# Patient Record
Sex: FEMALE | Race: WHITE | Employment: OTHER | URBAN - METROPOLITAN AREA
[De-identification: names, ages, dates, MRNs, and addresses within clinical notes are randomized per-mention and may not be internally consistent; named-entity substitution may affect disease eponyms.]

---

## 2018-03-14 ENCOUNTER — HOSPITAL ENCOUNTER (OUTPATIENT)
Dept: CT IMAGING | Age: 68
Discharge: HOME | End: 2018-03-14
Attending: SPECIALIST
Payer: MEDICARE

## 2018-03-14 DIAGNOSIS — H93.13 TINNITUS, BILATERAL: ICD-10-CM

## 2018-03-14 PROCEDURE — 70480 CT ORBIT/EAR/FOSSA W/O DYE: CPT

## 2018-08-14 ENCOUNTER — TELEPHONE (OUTPATIENT)
Dept: OBGYN CLINIC | Age: 68
End: 2018-08-14

## 2018-08-14 ENCOUNTER — OFFICE VISIT (OUTPATIENT)
Dept: OBGYN CLINIC | Age: 68
End: 2018-08-14

## 2018-08-14 VITALS
WEIGHT: 151 LBS | BODY MASS INDEX: 25.78 KG/M2 | DIASTOLIC BLOOD PRESSURE: 80 MMHG | SYSTOLIC BLOOD PRESSURE: 120 MMHG | HEIGHT: 64 IN

## 2018-08-14 DIAGNOSIS — N94.89 VULVAR BURNING: ICD-10-CM

## 2018-08-14 DIAGNOSIS — Z12.4 ROUTINE CERVICAL SMEAR: ICD-10-CM

## 2018-08-14 DIAGNOSIS — Z01.419 ENCOUNTER FOR GYNECOLOGICAL EXAMINATION (GENERAL) (ROUTINE) WITHOUT ABNORMAL FINDINGS: Primary | ICD-10-CM

## 2018-08-14 LAB
BILIRUB UR QL STRIP: NEGATIVE
GLUCOSE UR-MCNC: NEGATIVE MG/DL
KETONES P FAST UR STRIP-MCNC: NEGATIVE MG/DL
PH UR STRIP: NORMAL [PH] (ref 4.6–8)
PROT UR QL STRIP: NEGATIVE
SP GR UR STRIP: NORMAL (ref 1–1.03)
UA UROBILINOGEN AMB POC: NORMAL (ref 0.2–1)
URINALYSIS CLARITY POC: NORMAL
URINALYSIS COLOR POC: NORMAL
URINE BLOOD POC: NEGATIVE
URINE LEUKOCYTES POC: NEGATIVE
URINE NITRITES POC: NEGATIVE

## 2018-08-14 NOTE — TELEPHONE ENCOUNTER
Patient calling stating that she was seen today and given premarin and when was reading the side effects and warnings with the medication, the patient is concerned because she had severe headaches with estrace cream and now the premarin cream has there same side effects and warnings and she does not think she wants this cream.  Patient explained that in the note, Intrarosa would be the next alternative, but would be expensive and patient does not want that either. Patient became very upset over the phone and asked me to send the message to Dr. Hannah Gaming. Please advise.

## 2018-08-14 NOTE — TELEPHONE ENCOUNTER
Patient calling back and aware of MD recommendations. Patient was not happy with Md recommendations and states \" thank you\" and the call was disconnected.

## 2018-08-14 NOTE — PATIENT INSTRUCTIONS
Breast Self-Exam: Care Instructions  Your Care Instructions    A breast self-exam is when you check your breasts for lumps or changes. This regular exam helps you learn how your breasts normally look and feel. Most breast problems or changes are not because of cancer. Breast self-exam is not a substitute for a mammogram. Having regular breast exams by your doctor and regular mammograms improve your chances of finding any problems with your breasts. Some women set a time each month to do a step-by-step breast self-exam. Other women like a less formal system. They might look at their breasts as they brush their teeth, or feel their breasts once in a while in the shower. If you notice a change in your breast, tell your doctor. Follow-up care is a key part of your treatment and safety. Be sure to make and go to all appointments, and call your doctor if you are having problems. It's also a good idea to know your test results and keep a list of the medicines you take. How do you do a breast self-exam?  · The best time to examine your breasts is usually one week after your menstrual period begins. Your breasts should not be tender then. If you do not have periods, you might do your exam on a day of the month that is easy to remember. · To examine your breasts:  ¨ Remove all your clothes above the waist and lie down. When you are lying down, your breast tissue spreads evenly over your chest wall, which makes it easier to feel all your breast tissue. ¨ Use the pads-not the fingertips-of the 3 middle fingers of your left hand to check your right breast. Move your fingers slowly in small coin-sized circles that overlap. ¨ Use three levels of pressure to feel of all your breast tissue. Use light pressure to feel the tissue close to the skin surface. Use medium pressure to feel a little deeper. Use firm pressure to feel your tissue close to your breastbone and ribs.  Use each pressure level to feel your breast tissue before moving on to the next spot. ¨ Check your entire breast, moving up and down as if following a strip from the collarbone to the bra line, and from the armpit to the ribs. Repeat until you have covered the entire breast.  ¨ Repeat this procedure for your left breast, using the pads of the 3 middle fingers of your right hand. · To examine your breasts while in the shower:  ¨ Place one arm over your head and lightly soap your breast on that side. ¨ Using the pads of your fingers, gently move your hand over your breast (in the strip pattern described above), feeling carefully for any lumps or changes. ¨ Repeat for the other breast.  · Have your doctor inspect anything you notice to see if you need further testing. Where can you learn more? Go to http://elizabeth-wendy.info/. Enter P148 in the search box to learn more about \"Breast Self-Exam: Care Instructions. \"  Current as of: May 12, 2017  Content Version: 11.7  © 7687-3728 [employer]. Care instructions adapted under license by [employer] (which disclaims liability or warranty for this information). If you have questions about a medical condition or this instruction, always ask your healthcare professional. [place] [address] any warranty or liability for your use of this information.

## 2018-08-14 NOTE — TELEPHONE ENCOUNTER
We discussed at her visit that estrogen is really the best choice. She refused the estring and the vagifem. If she wants to try the intrarosa I am happy to send that for her. Premarin IS estrogen and they all have the same class labeling. I only changed it do insurance would pay. I'm sorry I don't have any other good options for her.  I would encourage to try the premarin

## 2018-08-14 NOTE — PROGRESS NOTES
Mandeep Diaz is a No obstetric history on file. ,  77 y.o. female [facility] whose LMP was on  who presents for her annual checkup. She is menopausal and amenorrheic. Patient c/o pelvic pressure and vaginal itching. She states it has been going on for about 2 years. Patient saw Dr. Danielle Segovia 9/19/2016 for vaginal itching. She is having no significant problems. Hormone Status:    She is not having vasomotor symptoms. The patient is not using HRT. Hx of using estrace cream and states it gave her a severe headache. She has not had any vaginal bleeding. She reports no gynecologic symptoms. Sexual history:    She  reports that she does not currently engage in sexual activity. Medical conditions:    Since her last annual GYN exam about two years ago, she has had the following changes in her health history: none. Surgical history confirmed with patient. has a past surgical history that includes hx orthopaedic; hx hip replacement (Bilateral); and hx acl reconstruction (Right). Pap and Mammogram History:    Her most recent Pap smear was normal, per patient obtained 2 year(s) ago. No record in [facility]. The patient had a recent mammogram 4/2018 which was negative for malignancy    Breast Cancer History/Substance Abuse:     She does not have a family history of breast cancer. Osteoporosis History:    Family history does not include a first or second degree relative with osteopenia or osteoporosis. A bone density scan was obtained 6/2017 and revealed osteopenia. She is currently taking calcium and vit D.     Past Medical History:   Diagnosis Date    Chest pain, unspecified     Endocrine disease     Essential hypertension, benign 3/28/2011    Family history of ischemic heart disease     GERD (gastroesophageal reflux disease)     Hypertension     Other and unspecified hyperlipidemia     Other ill-defined conditions(799.89)      Past Surgical History:   Procedure Laterality Date  HX ACL RECONSTRUCTION Right     HX HIP REPLACEMENT Bilateral     HX ORTHOPAEDIC       Current Outpatient Prescriptions   Medication Sig Dispense Refill    acyclovir (ZOVIRAX) 200 mg capsule Take 400 mg by mouth daily.  esomeprazole (NEXIUM) 40 mg capsule Take 40 mg by mouth daily.  BABY ASPIRIN PO Take 81 mg by mouth daily.  cholecalciferol, vitamin D3, 2,000 unit tab Take 2,000 Units by mouth daily.  atenolol (TENORMIN) 50 mg tablet Take 50 mg by mouth daily.  hydrochlorothiazide (MICROZIDE) 12.5 mg capsule Take 12.5 mg by mouth daily.  levothyroxine (SYNTHROID) 150 mcg tablet Take 137 mcg by mouth Daily (before breakfast).  clobetasol (TEMOVATE) 0.05 % ointment Apply  to affected area two (2) times a day. 15 g 0    HYDROcodone-acetaminophen (NORCO) 7.5-325 mg per tablet Take 1 Tab by mouth every six (6) hours as needed for Pain. Max Daily Amount: 4 Tabs. Do not drive within 6 hours of taking 20 Tab 0     Allergies: Erythromycin and Naproxen   Social History     Social History    Marital status:      Spouse name: N/A    Number of children: N/A    Years of education: N/A     Occupational History    Not on file. Social History Main Topics    Smoking status: Former Smoker    Smokeless tobacco: Never Used    Alcohol use No    Drug use: No    Sexual activity: Not Currently     Other Topics Concern    Not on file     Social History Narrative     Tobacco History:  reports that she has quit smoking. She has never used smokeless tobacco.  Alcohol Abuse:  reports that she does not drink alcohol. Drug Abuse:  reports that she does not use illicit drugs.   Patient Active Problem List   Diagnosis Code    Chest pain, unspecified R07.9    GERD (gastroesophageal reflux disease) K21.9    Other and unspecified hyperlipidemia E78.5    Family history of ischemic heart disease Z82.49    Essential hypertension, benign I10       Review of Systems - History obtained from the patient  Constitutional: negative for weight loss, fever, night sweats  HEENT: negative for hearing loss, earache, congestion, snoring, sorethroat  CV: negative for chest pain, palpitations, edema  Resp: negative for cough, shortness of breath, wheezing  GI: negative for change in bowel habits, abdominal pain, black or bloody stools  : negative for frequency, dysuria, hematuria, vaginal discharge  MSK: negative for back pain, joint pain, muscle pain  Breast: negative for breast lumps, nipple discharge, galactorrhea  Skin :negative for itching, rash, hives  Neuro: negative for dizziness, headache, confusion, weakness  Psych: negative for anxiety, depression, change in mood  Heme/lymph: negative for bleeding, bruising, pallor    Physical Exam    Visit Vitals    /80    Ht 5' 4\" (1.626 m)    Wt 151 lb (68.5 kg)    BMI 25.92 kg/m2     Constitutional  · Appearance: well-nourished, well developed, alert, in no acute distress    HENT  · Head and Face: appears normal    Neck  · Inspection/Palpation: normal appearance, no masses or tenderness  · Lymph Nodes: no lymphadenopathy present  · Thyroid: gland size normal, nontender, no nodules or masses present on palpation    Chest  · Respiratory Effort: breathing normal  · Auscultation: normal breath sounds    Cardiovascular  · Heart:  · Auscultation: regular rate and rhythm without murmur    Breasts  · Inspection of Breasts: breasts symmetrical, no skin changes, no discharge present, nipple appearance normal, no skin retraction present  · Palpation of Breasts and Axillae: no masses present on palpation, no breast tenderness  · Axillary Lymph Nodes: no lymphadenopathy present    Gastrointestinal  · Abdominal Examination: abdomen non-tender to palpation, normal bowel sounds, no masses present  · Liver and spleen: no hepatomegaly present, spleen not palpable  · Hernias: no hernias identified    Genitourinary  · External Genitalia: normal appearance for age with atrophy, no discharge present, no tenderness present, no inflammatory lesions present, no masses present  · Vagina:atrophic vaginal vault with pale epithelium and flattening of rugae, without central or paravaginal defects, no discharge present, no inflammatory lesions present, no masses present  · Bladder: non-tender to palpation  · Urethra: appears normal  · Cervix: normal   · Uterus: normal size, shape and consistency  · Adnexa: no adnexal tenderness present, no adnexal masses present  · Perineum: perineum within normal limits, no evidence of trauma, no rashes or skin lesions present  · Anus: anus within normal limits, no hemorrhoids present  · Inguinal Lymph Nodes: no lymphadenopathy present    Skin  · General Inspection: no rash, no lesions identified    Neurologic/Psychiatric  · Mental Status:  · Orientation: grossly oriented to person, place and time  · Mood and Affect: mood normal, affect appropriate    . Assessment:  Routine gynecologic examination  Her current medical status is satisfactory with no evidence of significant gynecologic issues. Vulvar burning/atrophy  Plan:  Counseled re: diet, exercise, healthy lifestyle  Return for yearly wellness visits  Rec annual mammogram  Will try premarin cream for vulvar burning/atropy. Very unlikely the topical estrace she was using caused headaches. Not much more to offer for vulvar symptom relief than estrogen.  Could try intrarosa but will be expensive as pt on medicare  Pap done

## 2018-08-16 LAB
A VAGINAE DNA VAG QL NAA+PROBE: NORMAL SCORE
BVAB2 DNA VAG QL NAA+PROBE: NORMAL SCORE
C ALBICANS DNA VAG QL NAA+PROBE: NEGATIVE
C GLABRATA DNA VAG QL NAA+PROBE: NEGATIVE
MEGA1 DNA VAG QL NAA+PROBE: NORMAL SCORE
T VAGINALIS RRNA SPEC QL NAA+PROBE: NEGATIVE

## 2018-08-17 LAB
CYTOLOGIST CVX/VAG CYTO: NORMAL
CYTOLOGY CVX/VAG DOC THIN PREP: NORMAL
CYTOLOGY HISTORY:: NORMAL
DX ICD CODE: NORMAL
LABCORP, 190119: NORMAL
Lab: NORMAL
Lab: NORMAL
OTHER STN SPEC: NORMAL
PATH REPORT.FINAL DX SPEC: NORMAL
STAT OF ADQ CVX/VAG CYTO-IMP: NORMAL

## 2019-12-03 ENCOUNTER — OFFICE VISIT (OUTPATIENT)
Dept: OBGYN CLINIC | Age: 69
End: 2019-12-03

## 2019-12-03 VITALS — BODY MASS INDEX: 25.92 KG/M2 | DIASTOLIC BLOOD PRESSURE: 76 MMHG | HEIGHT: 64 IN | SYSTOLIC BLOOD PRESSURE: 118 MMHG

## 2019-12-03 DIAGNOSIS — Z01.419 ENCOUNTER FOR GYNECOLOGICAL EXAMINATION (GENERAL) (ROUTINE) WITHOUT ABNORMAL FINDINGS: Primary | ICD-10-CM

## 2019-12-03 DIAGNOSIS — N95.2 VAGINAL ATROPHY: ICD-10-CM

## 2019-12-03 NOTE — PROGRESS NOTES
Cortez Carroll is a No obstetric history on file. ,  68 y.o. female [facility] whose LMP was on  who presents for her annual checkup. She is menopausal and amenorrheic. She c/o vaginal discomfort. Hormone Status:    She is not having vasomotor symptoms. The patient is using Premarin but only external. She would like to discuss risks for using it internal as she was told by a previous Dr. not to use HRT due to her having multiple surgeries and risk for blood clot. She has not had any vaginal bleeding. She reports no gynecologic symptoms. Sexual history:    She  reports previously being sexually active. Medical conditions:    Since her last annual GYN exam about 8/14/18 ago, she has had the following changes in her health history: none. Surgical history confirmed with patient. has a past surgical history that includes hx orthopaedic; hx hip replacement (Bilateral); and hx acl reconstruction (Right). Pap and Mammogram History:    Her most recent Pap smear wasnormal obtained 8/14/18 year(s) ago. The patient had a recent mammogram at Dr. Sara Jones office 9/2019 which was negative for malignancy    Breast Cancer History/Substance Abuse:     She does not have a family history of breast cancer. Osteoporosis History:    Family history does not include a first or second degree relative with osteopenia or osteoporosis. A bone density scan was obtained 6/2017 and revealed osteopenia. She is currently taking calcium and vit D.     Past Medical History:   Diagnosis Date    Chest pain, unspecified     Endocrine disease     Essential hypertension, benign 3/28/2011    Family history of ischemic heart disease     GERD (gastroesophageal reflux disease)     Hypertension     Other and unspecified hyperlipidemia     Other ill-defined conditions(799.89)      Past Surgical History:   Procedure Laterality Date    HX ACL RECONSTRUCTION Right     HX HIP REPLACEMENT Bilateral     HX ORTHOPAEDIC       Current Outpatient Medications   Medication Sig Dispense Refill    conjugated estrogens (PREMARIN) 0.625 mg/gram vaginal cream Insert 0.5 g into vagina daily. For 2 weeks then 2x/week 30 g 3    acyclovir (ZOVIRAX) 200 mg capsule Take 400 mg by mouth daily.  esomeprazole (NEXIUM) 40 mg capsule Take 40 mg by mouth daily.  BABY ASPIRIN PO Take 81 mg by mouth daily.  cholecalciferol, vitamin D3, 2,000 unit tab Take 2,000 Units by mouth daily.  atenolol (TENORMIN) 50 mg tablet Take 50 mg by mouth daily.  hydrochlorothiazide (MICROZIDE) 12.5 mg capsule Take 12.5 mg by mouth daily.  levothyroxine (SYNTHROID) 150 mcg tablet Take 137 mcg by mouth Daily (before breakfast).        Allergies: Erythromycin and Naproxen   Social History     Socioeconomic History    Marital status:      Spouse name: Not on file    Number of children: Not on file    Years of education: Not on file    Highest education level: Not on file   Occupational History    Not on file   Social Needs    Financial resource strain: Not on file    Food insecurity:     Worry: Not on file     Inability: Not on file    Transportation needs:     Medical: Not on file     Non-medical: Not on file   Tobacco Use    Smoking status: Former Smoker    Smokeless tobacco: Never Used   Substance and Sexual Activity    Alcohol use: No    Drug use: No    Sexual activity: Not Currently   Lifestyle    Physical activity:     Days per week: Not on file     Minutes per session: Not on file    Stress: Not on file   Relationships    Social connections:     Talks on phone: Not on file     Gets together: Not on file     Attends [employer] service: Not on file     Active member of club or organization: Not on file     Attends meetings of clubs or organizations: Not on file     Relationship status: Not on file    Intimate partner violence:     Fear of current or ex partner: Not on file     Emotionally abused: Not on file     Physically abused: Not on file     Forced sexual activity: Not on file   Other Topics Concern    Not on file   Social History Narrative    Not on file     Tobacco History:  reports that she has quit smoking. She has never used smokeless tobacco.  Alcohol Abuse:  reports no history of alcohol use. Drug Abuse:  reports no history of drug use.   Patient Active Problem List   Diagnosis Code    Chest pain, unspecified R07.9    GERD (gastroesophageal reflux disease) K21.9    Other and unspecified hyperlipidemia E78.5    Family history of ischemic heart disease Z82.49    Essential hypertension, benign I10       Review of Systems - History obtained from the patient  Constitutional: negative for weight loss, fever, night sweats  HEENT: negative for hearing loss, earache, congestion, snoring, sorethroat  CV: negative for chest pain, palpitations, edema  Resp: negative for cough, shortness of breath, wheezing  GI: negative for change in bowel habits, abdominal pain, black or bloody stools  : negative for frequency, dysuria, hematuria, vaginal discharge  MSK: negative for back pain, joint pain, muscle pain  Breast: negative for breast lumps, nipple discharge, galactorrhea  Skin :negative for itching, rash, hives  Neuro: negative for dizziness, headache, confusion, weakness  Psych: negative for anxiety, depression, change in mood  Heme/lymph: negative for bleeding, bruising, pallor    Physical Exam    Visit Vitals  Ht 5' 4\" (1.626 m)   BMI 25.92 kg/m²     Constitutional  · Appearance: well-nourished, well developed, alert, in no acute distress    HENT  · Head and Face: appears normal    Neck  · Inspection/Palpation: normal appearance, no masses or tenderness  · Lymph Nodes: no lymphadenopathy present  · Thyroid: gland size normal, nontender, no nodules or masses present on palpation    Chest  · Respiratory Effort: breathing normal  · Auscultation: normal breath sounds    Cardiovascular  · Heart:  · Auscultation: regular rate and rhythm without murmur    Breasts  · Inspection of Breasts: breasts symmetrical, no skin changes, no discharge present, nipple appearance normal, no skin retraction present  · Palpation of Breasts and Axillae: no masses present on palpation, no breast tenderness  · Axillary Lymph Nodes: no lymphadenopathy present    Gastrointestinal  · Abdominal Examination: abdomen non-tender to palpation, normal bowel sounds, no masses present  · Liver and spleen: no hepatomegaly present, spleen not palpable  · Hernias: no hernias identified    Genitourinary  · External Genitalia: normal appearance for age with atrophy, no discharge present, no tenderness present, no inflammatory lesions present, no masses present  · Vagina:atrophic vaginal vault with pale epithelium and flattening of rugae, without central or paravaginal defects, no discharge present, no inflammatory lesions present, no masses present  · Bladder: non-tender to palpation  · Urethra: appears normal  · Cervix: normal   · Uterus: normal size, shape and consistency  · Adnexa: no adnexal tenderness present, no adnexal masses present  · Perineum: perineum within normal limits, no evidence of trauma, no rashes or skin lesions present  · Anus: anus within normal limits, no hemorrhoids present  · Inguinal Lymph Nodes: no lymphadenopathy present    Skin  · General Inspection: no rash, no lesions identified    Neurologic/Psychiatric  · Mental Status:  · Orientation: grossly oriented to person, place and time  · Mood and Affect: mood normal, affect appropriate    .   Assessment:  Routine gynecologic examination  Vulvovaginal atrophy    Plan:  Counseled re: diet, exercise, healthy lifestyle  Return for yearly wellness visits  Rec annual mammogram  No hx of DVT - okay to use premarin 1/4 applicator qhs x 2 weeks then 2x/week advised needs to be consistent with use and may take several months to feel better

## 2019-12-05 ENCOUNTER — TELEPHONE (OUTPATIENT)
Dept: OBGYN CLINIC | Age: 69
End: 2019-12-05

## 2019-12-05 NOTE — TELEPHONE ENCOUNTER
Wasn't she already using this externally off an on? I am not aware that making your insides fall out feeling is a side effect - I'm not sure how that would cause that  If it's making her miserable then stop it. Might be that skin is just so irritated to begin with.  I will leave it up to her whether she wants to continue

## 2019-12-05 NOTE — TELEPHONE ENCOUNTER
Patient of [address] [place] [address]. She is concerned that she has paid well over $100 for Premarin Cream and she feels that she is worse using it. She feels that she irritated so severe that her skin hurts and feels like her \"insides is about to fall out\". She has only used it two nights. She said instead of this medication helping her she feels like it is causing her an infection. Is this a side effect that she is recommended to stop use of the medication or does it slowly improve? Come in for visit?       [address] [place] [address]

## 2019-12-05 NOTE — TELEPHONE ENCOUNTER
Patient has been advised. Will stop medication temporarily, let the skin have time to heal and try again. If the irritation is still present and not using medication, may need to be seen to see if needs culture. If improved, tries medication again and irritation returns, she will know it is irritation from this medication.

## 2021-06-14 ENCOUNTER — TRANSCRIBE ORDER (OUTPATIENT)
Dept: SCHEDULING | Age: 71
End: 2021-06-14

## 2021-06-14 DIAGNOSIS — M51.36 DDD (DEGENERATIVE DISC DISEASE), LUMBAR: Primary | ICD-10-CM

## 2021-06-14 DIAGNOSIS — M54.16 LUMBAR RADICULOPATHY: ICD-10-CM

## 2021-06-21 ENCOUNTER — HOSPITAL ENCOUNTER (OUTPATIENT)
Dept: MRI IMAGING | Age: 71
Discharge: HOME | End: 2021-06-21
Attending: ORTHOPAEDIC SURGERY
Payer: MEDICARE

## 2021-06-21 DIAGNOSIS — M51.36 DDD (DEGENERATIVE DISC DISEASE), LUMBAR: ICD-10-CM

## 2021-06-21 DIAGNOSIS — M54.16 LUMBAR RADICULOPATHY: ICD-10-CM

## 2021-06-21 PROCEDURE — 72148 MRI LUMBAR SPINE W/O DYE: CPT

## 2021-08-30 ENCOUNTER — OFFICE VISIT (OUTPATIENT)
Dept: OBGYN CLINIC | Age: 71
End: 2021-08-30
Payer: MEDICARE

## 2021-08-30 VITALS
HEIGHT: 64 IN | WEIGHT: 149 LBS | SYSTOLIC BLOOD PRESSURE: 118 MMHG | BODY MASS INDEX: 25.44 KG/M2 | DIASTOLIC BLOOD PRESSURE: 76 MMHG

## 2021-08-30 DIAGNOSIS — N94.9 VAGINAL BURNING: Primary | ICD-10-CM

## 2021-08-30 DIAGNOSIS — N89.8 VAGINAL DISCHARGE: ICD-10-CM

## 2021-08-30 LAB — WET MOUNT POCT, WMPOCT: NORMAL

## 2021-08-30 PROCEDURE — 87210 SMEAR WET MOUNT SALINE/INK: CPT | Performed by: OBSTETRICS & GYNECOLOGY

## 2021-08-30 PROCEDURE — G8427 DOCREV CUR MEDS BY ELIG CLIN: HCPCS | Performed by: OBSTETRICS & GYNECOLOGY

## 2021-08-30 PROCEDURE — G8754 DIAS BP LESS 90: HCPCS | Performed by: OBSTETRICS & GYNECOLOGY

## 2021-08-30 PROCEDURE — G8432 DEP SCR NOT DOC, RNG: HCPCS | Performed by: OBSTETRICS & GYNECOLOGY

## 2021-08-30 PROCEDURE — 1101F PT FALLS ASSESS-DOCD LE1/YR: CPT | Performed by: OBSTETRICS & GYNECOLOGY

## 2021-08-30 PROCEDURE — G8536 NO DOC ELDER MAL SCRN: HCPCS | Performed by: OBSTETRICS & GYNECOLOGY

## 2021-08-30 PROCEDURE — 1090F PRES/ABSN URINE INCON ASSESS: CPT | Performed by: OBSTETRICS & GYNECOLOGY

## 2021-08-30 PROCEDURE — 99213 OFFICE O/P EST LOW 20 MIN: CPT | Performed by: OBSTETRICS & GYNECOLOGY

## 2021-08-30 PROCEDURE — 3017F COLORECTAL CA SCREEN DOC REV: CPT | Performed by: OBSTETRICS & GYNECOLOGY

## 2021-08-30 PROCEDURE — G8419 CALC BMI OUT NRM PARAM NOF/U: HCPCS | Performed by: OBSTETRICS & GYNECOLOGY

## 2021-08-30 PROCEDURE — G8752 SYS BP LESS 140: HCPCS | Performed by: OBSTETRICS & GYNECOLOGY

## 2021-08-30 PROCEDURE — G8400 PT W/DXA NO RESULTS DOC: HCPCS | Performed by: OBSTETRICS & GYNECOLOGY

## 2021-08-30 RX ORDER — CLOTRIMAZOLE AND BETAMETHASONE DIPROPIONATE 10; .64 MG/G; MG/G
CREAM TOPICAL
Qty: 30 G | Refills: 0 | Status: SHIPPED | OUTPATIENT
Start: 2021-08-30

## 2021-08-30 NOTE — PROGRESS NOTES
Vaginitis evaluation    Chief Complaint   No chief complaint on file. HPI  66 y.o. female complains of {pe vag discharge desc:314162} vaginal discharge for *** days. No LMP recorded. Patient is postmenopausal.  She denies additional symptoms at this time. The patient denies aggravating factors. She is not concerned about possible STI exposure at this time. She denies exposure to new chemicals ot hygenic agents  Previous treatment included: none    Past Medical History:   Diagnosis Date    Chest pain, unspecified     Endocrine disease     Essential hypertension, benign 3/28/2011    Family history of ischemic heart disease     GERD (gastroesophageal reflux disease)     Hypertension     Other and unspecified hyperlipidemia     Other ill-defined conditions(799.89)      Past Surgical History:   Procedure Laterality Date    HX ACL RECONSTRUCTION Right     HX HIP REPLACEMENT Bilateral     HX ORTHOPAEDIC       Social History     Occupational History    Not on file   Tobacco Use    Smoking status: Former Smoker    Smokeless tobacco: Never Used   Substance and Sexual Activity    Alcohol use: No    Drug use: No    Sexual activity: Not Currently     Family History   Problem Relation Age of Onset    No Known Problems Mother     Hypertension Sister         Allergies   Allergen Reactions    Erythromycin Other (comments)     Stomach upset.  Naproxen Other (comments)     Stomach upset. Prior to Admission medications    Medication Sig Start Date End Date Taking? Authorizing Provider   conjugated estrogens (PREMARIN) 0.625 mg/gram vaginal cream 1/4 applicator in vagina at bedtime 2x/week 12/3/19   Rola Coelho MD   conjugated estrogens (PREMARIN) 0.625 mg/gram vaginal cream Insert 0.5 g into vagina daily. For 2 weeks then 2x/week 8/14/18   Rola Coelho MD   acyclovir (ZOVIRAX) 200 mg capsule Take 400 mg by mouth daily.     Pradeep Stein MD   esomeprazole (NEXIUM) 40 mg capsule Take 40 mg by mouth daily. Pradeep Stein MD   BABY ASPIRIN PO Take 81 mg by mouth daily. Pradeep Stein MD   cholecalciferol, vitamin D3, 2,000 unit tab Take 2,000 Units by mouth daily. Pradeep Stein MD   atenolol (TENORMIN) 50 mg tablet Take 50 mg by mouth daily. Pradeep Stein MD   hydrochlorothiazide (MICROZIDE) 12.5 mg capsule Take 12.5 mg by mouth daily. Pradeep Stein MD   levothyroxine (SYNTHROID) 150 mcg tablet Take 137 mcg by mouth Daily (before breakfast). Pradeep Stein MD                      Review of Systems - History obtained from the patient  Constitutional: negative for weight loss, fever, night sweats  Breast: negative for breast lumps, nipple discharge, galactorrhea  GI: negative for change in bowel habits, abdominal pain, black or bloody stools  : negative for frequency, dysuria, hematuria  MSK: negative for back pain, joint pain, muscle pain  Skin: negative for itching, rash, hives  Neuro: negative for dizziness, headache, confusion, weakness  Psych: negative for anxiety, depression, change in mood  Heme/lymph: negative for bleeding, bruising, pallor       Objective: There were no vitals taken for this visit.     Physical Exam:   PHYSICAL EXAMINATION    Constitutional  · Appearance: well-nourished, well developed, alert, in no acute distress    HENT  · Head and Face: appears normal    Genitourinary  · External Genitalia: normal appearance for age, *** discharge present, no tenderness present, no inflammatory lesions present, no masses present, no atrophy present  · Vagina:  *** discharge present, otherwise normal vaginal vault without central or paravaginal defects, no inflammatory lesions present, no masses present  · Bladder: non-tender to palpation  · Urethra: appears normal  · Cervix: normal   · Uterus: normal size, shape and consistency  · Adnexa: no adnexal tenderness present, no adnexal masses present  · Perineum: perineum within normal limits, no evidence of trauma, no rashes or skin lesions present  · Anus: anus within normal limits, no hemorrhoids present  · Inguinal Lymph Nodes: no lymphadenopathy present    Skin  · General Inspection: no rash, no lesions identified    Neurologic/Psychiatric  · Mental Status:  · Orientation: grossly oriented to person, place and time  · Mood and Affect: mood normal, affect appropriate      {STD testing during exam, press DELETE if none performed:09091}. No results found for any visits on 08/30/21. Assessment:   {vaginitis type:508189}    Plan:   Treatment: {vaginitis tx:823133::\"abstain from coitus during course of treatment\"}    ROV prn if symptoms persist or worsen.

## 2021-08-30 NOTE — PROGRESS NOTES
Vaginitis evaluation    Chief Complaint   Vaginitis      HPI  66 y.o. female complains of chunky white vaginal burning and discharge for several days. No LMP recorded. Patient is postmenopausal.  Additional symptoms include vaginal irritation. She states it hurts to sit down. Recently used premarin cream - only uses externally because she says it burns. Tried estrace cream in the past but she says it gave her migraines. The patient denies aggravating factors. She is not concerned about possible STI exposure at this time. She denies exposure to new chemicals ot hygenic agents  Previous treatment included: none    Past Medical History:   Diagnosis Date    Chest pain, unspecified     Endocrine disease     Essential hypertension, benign 3/28/2011    Family history of ischemic heart disease     GERD (gastroesophageal reflux disease)     Hypertension     Other and unspecified hyperlipidemia     Other ill-defined conditions(399.89)      Past Surgical History:   Procedure Laterality Date    HX ACL RECONSTRUCTION Right     HX HIP REPLACEMENT Bilateral     HX ORTHOPAEDIC       Social History     Occupational History    Not on file   Tobacco Use    Smoking status: Former Smoker    Smokeless tobacco: Never Used   Substance and Sexual Activity    Alcohol use: No    Drug use: No    Sexual activity: Not Currently     Family History   Problem Relation Age of Onset    No Known Problems Mother     Hypertension Sister         Allergies   Allergen Reactions    Erythromycin Other (comments)     Stomach upset.  Naproxen Other (comments)     Stomach upset. Prior to Admission medications    Medication Sig Start Date End Date Taking? Authorizing Provider   conjugated estrogens (PREMARIN) 0.625 mg/gram vaginal cream 1/4 applicator in vagina at bedtime 2x/week 12/3/19  Yes Mayra Llanes MD   conjugated estrogens (PREMARIN) 0.625 mg/gram vaginal cream Insert 0.5 g into vagina daily.  For 2 weeks then 2x/week 8/14/18  Yes Marcial Kaplan MD   acyclovir (ZOVIRAX) 200 mg capsule Take 400 mg by mouth daily. Yes Pradeep Stein MD   esomeprazole (NEXIUM) 40 mg capsule Take 40 mg by mouth daily. Yes Emil, MD Pradeep   BABY ASPIRIN PO Take 81 mg by mouth daily. Yes Emil, MD Pradeep   cholecalciferol, vitamin D3, 2,000 unit tab Take 2,000 Units by mouth daily. Yes Pradeep Stein MD   atenolol (TENORMIN) 50 mg tablet Take 50 mg by mouth daily. Yes Emil, MD Pradeep   hydrochlorothiazide (MICROZIDE) 12.5 mg capsule Take 12.5 mg by mouth daily. Yes Pradeep Stein MD   levothyroxine (SYNTHROID) 150 mcg tablet Take 137 mcg by mouth Daily (before breakfast).    Yes Emil, MD Pradeep                      Review of Systems - History obtained from the patient  Constitutional: negative for weight loss, fever, night sweats  Breast: negative for breast lumps, nipple discharge, galactorrhea  GI: negative for change in bowel habits, abdominal pain, black or bloody stools  : negative for frequency, dysuria, hematuria  MSK: negative for back pain, joint pain, muscle pain  Skin: negative for itching, rash, hives  Neuro: negative for dizziness, headache, confusion, weakness  Psych: negative for anxiety, depression, change in mood  Heme/lymph: negative for bleeding, bruising, pallor       Objective:    Visit Vitals  /76   Ht 5' 4\" (1.626 m)   Wt 149 lb (67.6 kg)   BMI 25.58 kg/m²       Physical Exam:   PHYSICAL EXAMINATION    Constitutional  · Appearance: well-nourished, well developed, alert, in no acute distress    HENT  · Head and Face: appears normal    Genitourinary  · External Genitalia: normal appearance for age, no discharge present, no tenderness present, no inflammatory lesions present, no masses present, no atrophy present  · Vagina:  no discharge present, otherwise normal vaginal vault without central or paravaginal defects, no inflammatory lesions present, no masses present  · Bladder: non-tender to palpation  · Urethra: appears normal  · Cervix: normal   · Uterus: normal size, shape and consistency  · Adnexa: no adnexal tenderness present, no adnexal masses present  · Perineum: perineum within normal limits, no evidence of trauma, no rashes or skin lesions present  · Anus: anus within normal limits, no hemorrhoids present  · Inguinal Lymph Nodes: no lymphadenopathy present    Skin  · General Inspection: no rash, no lesions identified    Neurologic/Psychiatric  · Mental Status:  · Orientation: grossly oriented to person, place and time  · Mood and Affect: mood normal, affect appropriate      Results for orders placed or performed in visit on 08/30/21   AMB POC SMEAR, STAIN & INTERPRET, WET MOUNT   Result Value Ref Range    Wet mount (POC)      Narrative    JAMI    Hypae: negative  Buds: negative    Wet Prep:  Trich: negative  Clue cells: negative  Hyphae: negative  Buds: negative  WBC's: normal               Assessment:   Vulvar burning    Plan:   Try to put premarin in vagina 2x/week  lotrisone bid x 2 weeks  Needs AE  Had mammo at PCP    ROV prn if symptoms persist or worsen.

## 2021-09-02 LAB
A VAGINAE DNA VAG QL NAA+PROBE: NORMAL SCORE
BVAB2 DNA VAG QL NAA+PROBE: NORMAL SCORE
C ALBICANS DNA VAG QL NAA+PROBE: NEGATIVE
C GLABRATA DNA VAG QL NAA+PROBE: NEGATIVE
MEGA1 DNA VAG QL NAA+PROBE: NORMAL SCORE
T VAGINALIS DNA VAG QL NAA+PROBE: NEGATIVE

## 2022-08-23 ENCOUNTER — TELEPHONE (OUTPATIENT)
Dept: OBGYN CLINIC | Age: 72
End: 2022-08-23

## 2022-08-23 NOTE — TELEPHONE ENCOUNTER
[address] TO THE PATIENT AND ADVISED HER THAT THE MEDICAL RECORDS REQUEST WAS FAXED TO [facility] ON 08/18/2022.  I GAVE HER THE PHONE NUMBER FOR [facility] SO SHE COULD CALL AND CHECK THE STATUS